# Patient Record
Sex: FEMALE | Race: WHITE | NOT HISPANIC OR LATINO | URBAN - METROPOLITAN AREA
[De-identification: names, ages, dates, MRNs, and addresses within clinical notes are randomized per-mention and may not be internally consistent; named-entity substitution may affect disease eponyms.]

---

## 2019-09-13 ENCOUNTER — OFFICE VISIT - HEALTHEAST (OUTPATIENT)
Dept: FAMILY MEDICINE | Facility: CLINIC | Age: 29
End: 2019-09-13

## 2019-09-13 DIAGNOSIS — Z13.228 SCREENING FOR METABOLIC DISORDER: ICD-10-CM

## 2019-09-13 DIAGNOSIS — Z12.11 SCREEN FOR COLON CANCER: ICD-10-CM

## 2019-09-13 DIAGNOSIS — Z13.0 SCREENING, ANEMIA, DEFICIENCY, IRON: ICD-10-CM

## 2019-09-13 DIAGNOSIS — Z12.4 SCREENING FOR MALIGNANT NEOPLASM OF CERVIX: ICD-10-CM

## 2019-09-13 DIAGNOSIS — R87.619 ABNORMAL GLANDULAR PAPANICOLAOU SMEAR OF CERVIX: ICD-10-CM

## 2019-09-13 DIAGNOSIS — Z00.00 ENCOUNTER FOR PREVENTIVE CARE: ICD-10-CM

## 2019-09-13 DIAGNOSIS — Z86.39 HISTORY OF VITAMIN D DEFICIENCY: ICD-10-CM

## 2019-09-13 DIAGNOSIS — F17.200 TOBACCO USE DISORDER: ICD-10-CM

## 2019-09-13 DIAGNOSIS — R22.0 LOCALIZED SWELLING, MASS AND LUMP, HEAD: ICD-10-CM

## 2019-09-13 DIAGNOSIS — K21.00 GASTROESOPHAGEAL REFLUX DISEASE WITH ESOPHAGITIS: ICD-10-CM

## 2019-09-13 DIAGNOSIS — Z13.220 SCREENING, LIPID: ICD-10-CM

## 2019-09-13 RX ORDER — OMEPRAZOLE 20 MG/1
20 TABLET, DELAYED RELEASE ORAL
Status: SHIPPED | COMMUNITY
Start: 2019-09-13

## 2019-09-13 ASSESSMENT — MIFFLIN-ST. JEOR: SCORE: 1561.83

## 2019-09-13 NOTE — ASSESSMENT & PLAN NOTE
Smoking cessation is advised.  She does desire cessation but is not quite ready.  We discussed that the next step would be to choose aquit date.  I did offer her referral to the smoking cessation program but she declined that for now.

## 2019-09-13 NOTE — ASSESSMENT & PLAN NOTE
There is a soft lipomatous lump noted behind the left ear over the left occiput. It measures aprox 2cm in diameter, is smooth, spherical and mobile.     I suspect it to be a benign lipoma, but patient is concerned because she said her chiropractor has noted it to be enlarging. For this reason an ultrasound will be done to evaluate further. Also of note she is a smoker.

## 2019-09-13 NOTE — ASSESSMENT & PLAN NOTE
Vitamin D, Total (25-Hydroxy)   Date Value Ref Range Status   06/13/2016 66.7 30.0 - 80.0 ng/mL Final     She takes vitamin D 5000 iu daily. Feels good and declined vit d level check today.

## 2019-09-13 NOTE — ASSESSMENT & PLAN NOTE
Flu shot - declined today.   Pap: done today.   Mammo:  There is no family or personal history, not indicated    Colonoscopy:  There is no family or personal history, not indicated     Std testing desired: declined -  offered  Osteoporosis prevention discussed.  vitamin d levels ordered. Recommend daily calcium and vitamin d intake to keep good bone health. Recommend weight bearing exercise, no tobacco, and limit alcohol  Dexa - no indication.  Recommend sunscreen, exercise, & healthy diet.  Offered tsh, glucose, hgb, lipid  I have had an Advance Directives discussion with the patient.   Body mass index is 29.54 kg/m .   mychart offered.

## 2019-09-13 NOTE — ASSESSMENT & PLAN NOTE
CHRONIC GERD, MENTIONED FOR FIRST TIME TODAY  Taking prilosec daily x 4 years and sometimes she also needs nexium.      Recommend egd. She agreed. Smoking cessation is advised.

## 2019-09-17 ENCOUNTER — COMMUNICATION - HEALTHEAST (OUTPATIENT)
Dept: FAMILY MEDICINE | Facility: CLINIC | Age: 29
End: 2019-09-17

## 2019-09-17 ENCOUNTER — HOSPITAL ENCOUNTER (OUTPATIENT)
Dept: ULTRASOUND IMAGING | Facility: CLINIC | Age: 29
Discharge: HOME | End: 2019-09-17
Attending: FAMILY MEDICINE

## 2019-09-17 DIAGNOSIS — R22.0 LOCALIZED SWELLING, MASS AND LUMP, HEAD: ICD-10-CM

## 2019-09-17 LAB
HPV SOURCE: NORMAL
HUMAN PAPILLOMA VIRUS 16 DNA: NEGATIVE
HUMAN PAPILLOMA VIRUS 18 DNA: NEGATIVE
HUMAN PAPILLOMA VIRUS FINAL DIAGNOSIS: NORMAL
HUMAN PAPILLOMA VIRUS OTHER HR: NEGATIVE
SPECIMEN DESCRIPTION: NORMAL

## 2019-09-17 NOTE — ASSESSMENT & PLAN NOTE
WORSENING  Ultrasound does confirm likely enlarging first brachial cleft cyst.   Non urgent ent consult recommended.

## 2019-10-07 ENCOUNTER — AMBULATORY - HEALTHEAST (OUTPATIENT)
Dept: LAB | Facility: CLINIC | Age: 29
End: 2019-10-07

## 2019-10-07 DIAGNOSIS — Z13.220 SCREENING, LIPID: ICD-10-CM

## 2019-10-07 DIAGNOSIS — Z13.0 SCREENING, ANEMIA, DEFICIENCY, IRON: ICD-10-CM

## 2019-10-07 DIAGNOSIS — Z13.228 SCREENING FOR METABOLIC DISORDER: ICD-10-CM

## 2019-10-07 LAB
ALBUMIN SERPL-MCNC: 3.9 G/DL (ref 3.5–5)
ALP SERPL-CCNC: 65 U/L (ref 45–120)
ALT SERPL W P-5'-P-CCNC: 10 U/L (ref 0–45)
ANION GAP SERPL CALCULATED.3IONS-SCNC: 9 MMOL/L (ref 5–18)
AST SERPL W P-5'-P-CCNC: 12 U/L (ref 0–40)
BILIRUB SERPL-MCNC: 0.6 MG/DL (ref 0–1)
BUN SERPL-MCNC: 10 MG/DL (ref 8–22)
CALCIUM SERPL-MCNC: 9.8 MG/DL (ref 8.5–10.5)
CHLORIDE BLD-SCNC: 103 MMOL/L (ref 98–107)
CHOLEST SERPL-MCNC: 152 MG/DL
CO2 SERPL-SCNC: 25 MMOL/L (ref 22–31)
CREAT SERPL-MCNC: 0.8 MG/DL (ref 0.6–1.1)
ERYTHROCYTE [DISTWIDTH] IN BLOOD BY AUTOMATED COUNT: 11.9 % (ref 11–14.5)
FASTING STATUS PATIENT QL REPORTED: YES
GFR SERPL CREATININE-BSD FRML MDRD: >60 ML/MIN/1.73M2
GLUCOSE BLD-MCNC: 87 MG/DL (ref 70–125)
HCT VFR BLD AUTO: 40.7 % (ref 35–47)
HDLC SERPL-MCNC: 52 MG/DL
HGB BLD-MCNC: 13.8 G/DL (ref 12–16)
LDLC SERPL CALC-MCNC: 57 MG/DL
MCH RBC QN AUTO: 30.1 PG (ref 27–34)
MCHC RBC AUTO-ENTMCNC: 33.9 G/DL (ref 32–36)
MCV RBC AUTO: 89 FL (ref 80–100)
PLATELET # BLD AUTO: 286 THOU/UL (ref 140–440)
PMV BLD AUTO: 6.5 FL (ref 7–10)
POTASSIUM BLD-SCNC: 4.1 MMOL/L (ref 3.5–5)
PROT SERPL-MCNC: 6.3 G/DL (ref 6–8)
RBC # BLD AUTO: 4.59 MILL/UL (ref 3.8–5.4)
SODIUM SERPL-SCNC: 137 MMOL/L (ref 136–145)
TRIGL SERPL-MCNC: 213 MG/DL
WBC: 8.4 THOU/UL (ref 4–11)

## 2019-10-09 ENCOUNTER — OFFICE VISIT - HEALTHEAST (OUTPATIENT)
Dept: OTOLARYNGOLOGY | Facility: CLINIC | Age: 29
End: 2019-10-09

## 2019-10-09 DIAGNOSIS — R22.0 POSTAURICULAR SWELLING: ICD-10-CM

## 2019-10-25 ASSESSMENT — MIFFLIN-ST. JEOR
SCORE: 1561.83
SCORE: 1561.83

## 2019-10-29 ENCOUNTER — ANESTHESIA - HEALTHEAST (OUTPATIENT)
Dept: SURGERY | Facility: AMBULATORY SURGERY CENTER | Age: 29
End: 2019-10-29

## 2019-10-30 ENCOUNTER — HOSPITAL ENCOUNTER (OUTPATIENT)
Dept: SURGERY | Facility: AMBULATORY SURGERY CENTER | Age: 29
Discharge: HOME | End: 2019-10-30
Attending: OTOLARYNGOLOGY | Admitting: OTOLARYNGOLOGY

## 2019-10-30 ENCOUNTER — SURGERY - HEALTHEAST (OUTPATIENT)
Dept: SURGERY | Facility: AMBULATORY SURGERY CENTER | Age: 29
End: 2019-10-30

## 2019-10-30 DIAGNOSIS — L72.3 SEBACEOUS CYST OF EAR: ICD-10-CM

## 2019-10-30 DIAGNOSIS — L72.9 SCALP CYST: ICD-10-CM

## 2019-10-30 LAB
POC PREG URINE (HCG) HE - HISTORICAL: NEGATIVE
POCT KIT EXPIRATION DATE HE - HISTORICAL: NORMAL
POCT KIT LOT NUMBER HE - HISTORICAL: NORMAL
POCT NEGATIVE CONTROL HE - HISTORICAL: NORMAL
POCT POSITIVE CONTROL HE - HISTORICAL: NORMAL

## 2019-10-30 RX ORDER — HYDROCODONE BITARTRATE AND ACETAMINOPHEN 5; 325 MG/1; MG/1
1 TABLET ORAL EVERY 4 HOURS PRN
Qty: 10 TABLET | Refills: 0 | Status: SHIPPED | OUTPATIENT
Start: 2019-10-30

## 2019-10-30 ASSESSMENT — MIFFLIN-ST. JEOR
SCORE: 1561.83
SCORE: 1561.83

## 2019-11-11 ENCOUNTER — COMMUNICATION - HEALTHEAST (OUTPATIENT)
Dept: OTOLARYNGOLOGY | Facility: CLINIC | Age: 29
End: 2019-11-11

## 2021-01-25 ENCOUNTER — AMBULATORY - HEALTHEAST (OUTPATIENT)
Dept: NURSING | Facility: CLINIC | Age: 31
End: 2021-01-25

## 2021-02-15 ENCOUNTER — AMBULATORY - HEALTHEAST (OUTPATIENT)
Dept: NURSING | Facility: CLINIC | Age: 31
End: 2021-02-15

## 2021-05-26 ENCOUNTER — RECORDS - HEALTHEAST (OUTPATIENT)
Dept: ADMINISTRATIVE | Facility: CLINIC | Age: 31
End: 2021-05-26

## 2021-05-27 ENCOUNTER — RECORDS - HEALTHEAST (OUTPATIENT)
Dept: ADMINISTRATIVE | Facility: CLINIC | Age: 31
End: 2021-05-27

## 2021-06-01 NOTE — TELEPHONE ENCOUNTER
Called to inform of ultrasound results, first brachial cleft cyst suspected. Recommend nonurgent ent consult. She understood and agreed.

## 2021-06-02 ENCOUNTER — RECORDS - HEALTHEAST (OUTPATIENT)
Dept: ADMINISTRATIVE | Facility: CLINIC | Age: 31
End: 2021-06-02

## 2021-06-02 NOTE — PROGRESS NOTES
HISTORY OF PRESENT ILLNESS  Asked to see by Dr. Alcantar for evaluation of neck mass. Patient reports that she has a swelling that has developed over the left upper neck behind the ear. The mass is not painful. No redness overlying the skin. She has had the mass for many years and it has slowly enlarged. No fever, sweats or chills.     REVIEW OF SYSTEMS  Review of Systems: a 10-system review was performed. Pertinent positives are noted in the HPI and on a separate scanned document in the chart.    PMH, PSH, FH and SH has documented in the EHR.      EXAM    CONSTITUTIONAL  General Appearance:  Normal, well developed, well nourished, no obvious distress  Ability to Communicate:  communicates appropriately.    HEAD AND FACE  Appearance and Symmetry:  Normal, no scalp or facial scarring or suspicious lesions.  Paranasal sinuses tenderness:  Normal, Paranasal sinuses non tender    EARS  Clinical speech reception threshold:  Normal, able to hear normal speech.  Auricle:  Normal, Auricles without scars, lesions, masses.  External auditory canal:  Normal, External auditory canal normal.  Tympanic membrane:  Normal, Tympanic membranes normal without swelling or erythema.  Mastoid: 3cm cyst overlying the left mastoid/upper neck. It is slightly mobile and feels somewhat cystic.    NOSE (speculum or scope)  Architecture:  Normal, Grossly normal external nasal architecture with no masses or lesions.  Mucosa:  Normal mucosa, No polyps or masses.  Septum:  Normal, Septum non-obstructing.  Turbinates:  Normal, No turbinate abnormalities    ORAL CAVITY AND OROPHARYNX  Lips:  Normal.  Dental and gingiva:  Normal, No obvious dental or gingival disease.  Mucosa:  Normal, Moist mucous membranes.  Tongue:  Normal, Tongue mobile with no mucosal abnormalities  Hard and soft palate:  Normal, Hard and soft palate without cleft or mucosal lesions.  Oral pharynx:  Normal, Posterior pharynx without lesions or remarkable asymmetry.  Saliva:   Normal, Clear saliva.  Masses:  Normal, No palpable masses or pathologically enlarged lymph nodes.    NECK  Masses/lymph nodes:  Normal, No worrisome neck masses or lymph nodes.  Salivary glands:  Normal, Parotid and submandibular glands.  Trachea and larynx position:  Normal, Trachea and larynx midline.  Thyroid:  Normal, No thyroid abnormality.  Tenderness:  Normal, No cervical tenderness.  Suppleness:  Normal, Neck supple    NEUROLOGICAL  Speech pattern:  Normal, Proasaic    RESPIRATORY  Symmetry and Respiratory effort:  Normal, Symmetric chest movement and expansion with no increased intercostal retractions or use of accessory muscles.     ULTRASOUND NECK  Report reviewed. Comparison with prior imaging study shows 6-fold growth.     IMPRESSION  Cyst left postauricular area.     RECOMMENDATION  Excision of cyst.I discussed the procedure, goals and risks. She would like to proceed. Will do this under local anesthesia.     Isra Arora MD

## 2021-06-02 NOTE — OP NOTE
Otolaryngology Full Operative Report    Date of Operation:  10/30/19    Pre-operative Diagnosis:  Cyst / mass left posterior upper neck  Post-operative Diagnosis:  Cyst left posterior upper neck  Procedure(s):  Excisional biopsy cyst left posterior upper neck    Surgeon: Isra Arora MD  Assistant(s):  None  Anesthesia:  General    Procedure in Detail:  Patient was brought to the OR. The patient was then prepped and draped in the normal fashion for excision of cyst posterior neck. The cyst was located in the hairline. An incision was marked parallel to and within the hair line overlying the cyst.  Skin flaps were elevated and the cyst was dissected bluntly with scissors and cautery. Hemostasis was attained with cautery. The wound was irrigated with water. The incision was closed with 4-0 monocryl suture in the dermis followed by dermabond on the skin. The patient was taken to the PACU in stable condition.     Findings:  Cyst left posterior neck.     Specimen(s):  Cyst sent to pathology in formalin.    EBL:  1ml    Complications:  None        Isra Arora MD  [facility]  [phone] (o)

## 2021-06-03 VITALS
HEIGHT: 66 IN | BODY MASS INDEX: 29.41 KG/M2 | WEIGHT: 183 LBS | WEIGHT: 183 LBS | BODY MASS INDEX: 29.41 KG/M2 | HEIGHT: 66 IN

## 2021-06-03 VITALS
SYSTOLIC BLOOD PRESSURE: 130 MMHG | DIASTOLIC BLOOD PRESSURE: 84 MMHG | HEIGHT: 66 IN | RESPIRATION RATE: 16 BRPM | WEIGHT: 183 LBS | BODY MASS INDEX: 29.41 KG/M2 | HEART RATE: 88 BPM

## 2021-06-16 PROBLEM — R22.0 LOCALIZED SWELLING, MASS AND LUMP, HEAD: Status: ACTIVE | Noted: 2019-09-14

## 2021-06-16 PROBLEM — F17.200 TOBACCO USE DISORDER: Status: ACTIVE | Noted: 2019-09-14

## 2021-06-16 PROBLEM — Z00.00 ENCOUNTER FOR PREVENTIVE CARE: Status: ACTIVE | Noted: 2019-09-13

## 2021-06-20 ENCOUNTER — HEALTH MAINTENANCE LETTER (OUTPATIENT)
Age: 31
End: 2021-06-20

## 2021-06-28 NOTE — PROGRESS NOTES
Progress Notes by Katlyn Alcantar MD at 9/13/2019  4:20 PM     Author: Katlyn Alcantar MD Service: -- Author Type: Physician    Filed: 9/14/2019  3:57 PM Encounter Date: 9/13/2019 Status: Signed    : Katlyn Alcantar MD (Physician)       FEMALE PREVENTATIVE EXAM  Lump in neck and symptoms of ongoing gerd concerning enough to indicate egd addressed above and beyond usual scope of annual exam.    Assessment and Plan:       Problem List Items Addressed This Visit        High    Localized swelling, mass and lump, head     There is a soft lipomatous lump noted behind the left ear over the left occiput. It measures aprox 2cm in diameter, is smooth, spherical and mobile.     I suspect it to be a benign lipoma, but patient is concerned because she said her chiropractor has noted it to be enlarging. For this reason an ultrasound will be done to evaluate further. Also of note she is a smoker.          Relevant Orders    US Neck Limited    Tobacco use disorder     Smoking cessation is advised.  She does desire cessation but is not quite ready.  We discussed that the next step would be to choose a quit date.  I did offer her referral to the smoking cessation program but she declined that for now.            Medium    Esophageal reflux     CHRONIC GERD, MENTIONED FOR FIRST TIME TODAY  Taking prilosec daily x 4 years and sometimes she also needs nexium.      Recommend egd. She agreed. Smoking cessation is advised.          Relevant Orders    Ambulatory Referral for Upper GI Endoscopy       Unprioritized    History of vitamin D deficiency     Vitamin D, Total (25-Hydroxy)   Date Value Ref Range Status   06/13/2016 66.7 30.0 - 80.0 ng/mL Final     She takes vitamin D 5000 iu daily. Feels good and declined vit d level check today.          Cerv Pap Smear (+) Atyp Glandular Cells Undetermined Signif     Over due for repeat pap.   Pap today.          Encounter for preventive care - Primary     Flu shot - declined  today.   Pap: done today.   Mammo:  There is no family or personal history, not indicated    Colonoscopy:  There is no family or personal history, not indicated     Std testing desired: declined -  offered  Osteoporosis prevention discussed.  vitamin d levels ordered. Recommend daily calcium and vitamin d intake to keep good bone health. Recommend weight bearing exercise, no tobacco, and limit alcohol  Dexa - no indication.  Recommend sunscreen, exercise, & healthy diet.  Offered tsh, glucose, hgb, lipid  I have had an Advance Directives discussion with the patient.   Body mass index is 29.54 kg/m .   mychart offered.             Other Visit Diagnoses     Screen for colon cancer        Screening for malignant neoplasm of cervix        Relevant Orders    Gynecologic Cytology (PAP Smear)    Screening, lipid        Relevant Orders    Lipid Cascade    Screening for metabolic disorder        Relevant Orders    Comprehensive Metabolic Panel    Screening, anemia, deficiency, iron        Relevant Orders    HM2(CBC w/o Differential)            Next follow up:  Return in about 1 year (around 9/13/2020) for Annual physical fasting labs. .    Immunization Review  Adult Imm Review: Missing doses of flu shot, declined.  Social History     Tobacco Use   Smoking Status Current Every Day Smoker   ? Packs/day: 0.25   ? Types: Cigarettes   Smokeless Tobacco Never Used        I discussed the following with the patient:   Adult Healthy Living: Importance of regular exercise  Healthy nutrition  Weight loss referral options    I have had an Advance Directives discussion with the patient.    Subjective:   Chief Complaint: Kacie Martinez is an 28 y.o. female here for a preventative health visit.     HPI:  She tells me that she she has heartburn and needs to take Prilosec every day.  She is done this for the years and sometimes it so bad she also needs to takes Nexium.    She has a second concern today.  She says that her chiropractor has  "noticed that she has a lump on the back of her neck on the left which seems to be enlarging in size she is worried about it because of the change.    Healthy Habits  Are you taking a daily aspirin? No  Do you typically exercising at least 40 min, 3-4 times per week?  Yes  Do you usually eat at least 4 servings of fruit and vegetables a day, include whole grains and fiber and avoid regularly eating high fat foods? Yes  Have you had an eye exam in the past two years? NO  Do you see a dentist twice per year? Yes  Do you have any concerns regarding sleep? No    Safety Screen  If you own firearms, are they secured in a locked gun cabinet or with trigger locks? The patient does not own any firearms  Do you feel you are safe where you are living?: Yes (9/13/2019  3:52 PM)  Do you feel you are safe in your relationship(s)?: Yes (9/13/2019  3:52 PM)      Review of Systems:  Please see above.  The rest of the review of systems are negative for all systems.     Pap History:   Yes - updated in Problem List and Health Maintenance accordingly  Cancer Screening       Status Date      PAP SMEAR Overdue 4/16/2017      Done 4/16/2014 GYNECOLOGIC CYTOLOGY (PAP SMEAR)          Patient Care Team:  Eugene Way MD as PCP - General (Family Medicine)        History     Reviewed By Date/Time Sections Reviewed    Katlyn Alcantar MD 9/14/2019  3:49 PM Social Documentation    Katlyn Alcantar MD 9/14/2019  3:47 PM Social Documentation    Katlyn Alcantar MD 9/13/2019  4:06 PM Family    Katlyn Alcantar MD 9/13/2019  3:57 PM Medical, Surgical, Family    Micki Whitfield CMA 9/13/2019  3:52 PM Tobacco, Alcohol, Drug Use, Sexual Activity, Obstetric            Objective:   Vital Signs:   Visit Vitals  /84   Pulse 88   Resp 16   Ht 5' 6\" (1.676 m)   Wt 183 lb (83 kg)   LMP 09/05/2019 (Exact Date)   Breastfeeding? No   BMI 29.54 kg/m           PHYSICAL EXAM  Physical Exam   Constitutional: She is oriented to person, " place, and time. She appears well-developed and well-nourished. No distress.   HENT:   Head: Normocephalic and atraumatic.   Right Ear: External ear normal.   Left Ear: External ear normal.   Nose: Nose normal.   Mouth/Throat: Oropharynx is clear and moist.   Eyes: Conjunctivae are normal.   Neck: Neck supple.       There is a soft lipomatous lump noted behind the left ear over the left occiput. It measures aprox 2cm in diameter, is smooth, spherical and mobile.    Cardiovascular: Normal rate, regular rhythm and normal heart sounds.   Pulmonary/Chest: Effort normal and breath sounds normal. Right breast exhibits no inverted nipple, no mass, no nipple discharge, no skin change and no tenderness. Left breast exhibits no inverted nipple, no mass, no nipple discharge, no skin change and no tenderness. No breast swelling, tenderness, discharge or bleeding. Breasts are symmetrical.   Abdominal: Soft. Bowel sounds are normal. She exhibits no distension and no mass. There is no tenderness. There is no rebound and no guarding. No hernia.   Musculoskeletal: Normal range of motion.   Neurological: She is alert and oriented to person, place, and time.   Skin: Skin is warm and dry.   Psychiatric: She has a normal mood and affect.               Medication List           Accurate as of 9/13/19 11:59 PM. If you have any questions, ask your nurse or doctor.               CONTINUE taking these medications    cholecalciferol (vitamin D3) 5,000 unit Tab  INSTRUCTIONS:  Take by mouth.        omeprazole 20 MG tablet  Also known as:  PriLOSEC OTC  INSTRUCTIONS:  Take 20 mg by mouth daily before breakfast.               Additional Screenings Completed Today:

## 2021-10-11 ENCOUNTER — HEALTH MAINTENANCE LETTER (OUTPATIENT)
Age: 31
End: 2021-10-11

## 2022-07-17 ENCOUNTER — HEALTH MAINTENANCE LETTER (OUTPATIENT)
Age: 32
End: 2022-07-17

## 2022-09-25 ENCOUNTER — HEALTH MAINTENANCE LETTER (OUTPATIENT)
Age: 32
End: 2022-09-25

## 2023-08-05 ENCOUNTER — HEALTH MAINTENANCE LETTER (OUTPATIENT)
Age: 33
End: 2023-08-05